# Patient Record
Sex: FEMALE | Race: WHITE | NOT HISPANIC OR LATINO | Employment: FULL TIME | URBAN - METROPOLITAN AREA
[De-identification: names, ages, dates, MRNs, and addresses within clinical notes are randomized per-mention and may not be internally consistent; named-entity substitution may affect disease eponyms.]

---

## 2023-12-02 ENCOUNTER — TELEMEDICINE (OUTPATIENT)
Dept: FAMILY MEDICINE CLINIC | Facility: TELEHEALTH | Age: 38
End: 2023-12-02
Payer: COMMERCIAL

## 2023-12-02 DIAGNOSIS — H10.32 ACUTE CONJUNCTIVITIS OF LEFT EYE, UNSPECIFIED ACUTE CONJUNCTIVITIS TYPE: Primary | ICD-10-CM

## 2023-12-02 RX ORDER — OFLOXACIN 3 MG/ML
1 SOLUTION/ DROPS OPHTHALMIC 4 TIMES DAILY
Qty: 5 ML | Refills: 0 | Status: SHIPPED | OUTPATIENT
Start: 2023-12-02 | End: 2023-12-09

## 2023-12-02 NOTE — PROGRESS NOTES
CHIEF COMPLAINT  Chief Complaint   Patient presents with    Conjunctivitis         HPI  Moira Caraballo is a 38 y.o. female  presents with complaint of left eye conjunctivitis that started overnight. 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 She has thick green matting on her eyes this morning.     Review of Systems   Constitutional:  Negative for chills, diaphoresis, fatigue and fever.   HENT:  Negative for congestion, rhinorrhea and sinus pressure.    Eyes:  Positive for pain, discharge and redness. Negative for photophobia, itching and visual disturbance.   Respiratory:  Negative for cough.        History reviewed. No pertinent past medical history.    No family history on file.    Social History     Socioeconomic History    Marital status:    Tobacco Use    Smoking status: Never     Passive exposure: Never    Smokeless tobacco: Never       Moira Caraballo  reports that she has never smoked. She has never been exposed to tobacco smoke. She has never used smokeless tobacco.         LMP 11/27/2023 (Approximate)   Breastfeeding No     PHYSICAL EXAM  Physical Exam   Eyes: Right eye exhibits no discharge, no exudate, no hordeolum and no edema. No foreign body present in the right eye. Left eye exhibits exudate. Left eye exhibits no discharge, no hordeolum and no edema. No foreign body present in the left eye. Eyelid: no right ptosis or left ptosis. Right conjunctiva is not injected. Left conjunctiva is injected. Left conjunctiva has no hemorrhage. No scleral icterus.           Diagnoses and all orders for this visit:    1. Acute conjunctivitis of left eye, unspecified acute conjunctivitis type (Primary)    Other orders  -     ofloxacin (Ocuflox) 0.3 % ophthalmic solution; Administer 1 drop into the left eye 4 (Four) Times a Day for 7 days.  Dispense: 5 mL; Refill: 0        The use of a video visit has been reviewed with the patient and verbal informed consent has been obtained. Myself and Moira Caraballo  participated in this visit. The patient is located in [address] [place] [facility]. I am located in [place], [place]. [employer] and [employer] were utilized.       Note Disclaimer: At [facility], we believe that sharing information builds trust and better   relationships. You are receiving this note because you recently visited [facility]. It is possible you   will see health information before a provider has talked with you about it. This kind of information can   be easy to misunderstand. To help you fully understand what it means for your health, we urge you to   discuss this note with your provider.    Barbara Taylor, [id number]  12/02/2023  08:16 EST

## 2023-12-02 NOTE — PATIENT INSTRUCTIONS
Warm compresses to remove eye discharge and warm or cool compressed for comfort.   Wash hands before and after touching eye and instilling eye drops   If symptoms do not improve on 7 days or if symptoms worsen anytime follow up.     Bacterial Conjunctivitis, Adult  Bacterial conjunctivitis is an infection of your conjunctiva. This is the clear membrane that covers the white part of your eye and the inner part of your eyelid. This infection can make your eye:  Red or pink.  Itchy or irritated.  This condition spreads easily from person to person (is contagious) and from one eye to the other eye.  What are the causes?  This condition is caused by germs (bacteria). You may get the infection if you come into close contact with:  A person who has the infection.  Items that have germs on them (are contaminated), such as face towels, contact lens solution, or eye makeup.  What increases the risk?  You are more likely to get this condition if:  You have contact with people who have the infection.  You wear contact lenses.  You have a sinus infection.  You have had a recent eye injury or surgery.  You have a weak body defense system (immune system).  You have dry eyes.  What are the signs or symptoms?    Thick, yellowish discharge from the eye.  Tearing or watery eyes.  Itchy eyes.  Burning feeling in your eyes.  Eye redness.  Swollen eyelids.  Blurred vision.  How is this treated?    Antibiotic eye drops or ointment.  Antibiotic medicine taken by mouth. This is used for infections that do not get better with drops or ointment or that last more than 10 days.  Cool, wet cloths placed on the eyes.  Artificial tears used 2-6 times a day.  Follow these instructions at home:  Medicines  Take or apply your antibiotic medicine as told by your doctor. Do not stop using it even if you start to feel better.  Take or apply over-the-counter and prescription medicines only as told by your doctor.  Do not touch your eyelid with the  eye-drop bottle or the ointment tube.  Managing discomfort  Wipe any fluid from your eye with a warm, wet washcloth or a cotton ball.  Place a clean, cool, wet cloth on your eye. Do this for 10-20 minutes, 3-4 times a day.  General instructions  Do not wear contacts until the infection is gone. Wear glasses until your doctor says it is okay to wear contacts again.  Do not wear eye makeup until the infection is gone. Throw away old eye makeup.  Change or wash your pillowcase every day.  Do not share towels or washcloths.  Wash your hands often with soap and water for at least 20 seconds and especially before touching your face or eyes. Use paper towels to dry your hands.  Do not touch or rub your eyes.  Do not drive or use heavy machinery if your vision is blurred.  Contact a doctor if:  You have a fever.  You do not get better after 10 days.  Get help right away if:  You have a fever and your symptoms get worse all of a sudden.  You have very bad pain when you move your eye.  Your face:  Hurts.  Is red.  Is swollen.  You have sudden loss of vision.  Summary  Bacterial conjunctivitis is an infection of your conjunctiva.  This infection spreads easily from person to person.  Wash your hands often with soap and water for at least 20 seconds and especially before touching your face or eyes. Use paper towels to dry your hands.  Take or apply your antibiotic medicine as told by your doctor.  Contact a doctor if you have a fever or you do not get better after 10 days.  This information is not intended to replace advice given to you by your health care provider. Make sure you discuss any questions you have with your health care provider.  Document Revised: 03/30/2022 Document Reviewed: 03/30/2022  [employer]vier Patient Education © 2023 [employer]